# Patient Record
Sex: FEMALE | Race: WHITE | NOT HISPANIC OR LATINO
[De-identification: names, ages, dates, MRNs, and addresses within clinical notes are randomized per-mention and may not be internally consistent; named-entity substitution may affect disease eponyms.]

---
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
## 2020-12-14 ENCOUNTER — APPOINTMENT (OUTPATIENT)
Dept: PEDIATRIC RHEUMATOLOGY | Facility: CLINIC | Age: 20
End: 2020-12-14
Payer: COMMERCIAL

## 2020-12-14 VITALS
WEIGHT: 119.49 LBS | HEIGHT: 63.58 IN | TEMPERATURE: 97.8 F | BODY MASS INDEX: 20.91 KG/M2 | SYSTOLIC BLOOD PRESSURE: 123 MMHG | DIASTOLIC BLOOD PRESSURE: 86 MMHG | HEART RATE: 99 BPM

## 2020-12-14 PROCEDURE — 99072 ADDL SUPL MATRL&STAF TM PHE: CPT

## 2020-12-14 PROCEDURE — 99215 OFFICE O/P EST HI 40 MIN: CPT

## 2020-12-15 NOTE — PHYSICAL EXAM
[Conjunctiva] : normal conjunctiva [Pupils] : pupils were equal and round [Ears] : normal ears [Gums] : normal gums [Oropharynx] : normal oropharynx [Palate] : normal palate [Cardiac Auscultation] : normal cardiac auscultation  [Respiratory Effort] : normal respiratory effort [Auscultation] : lungs clear to auscultation [Liver] : normal liver [Spleen] : normal spleen [Range Of Motion] : full  range of motion [Gait] : normal gait [Grossly Intact] : grossly intact [Normal] : normal [Not Examined] : not examined [1] : 1 [_______] : Knee: [unfilled] [Rash] : no rash [Lesions] : no lesions [Malar Erythema] : no malar erythema [Ulcers] : no ulcers [Erythematous] : not erythematous [Peripheral Edema] : no peripheral edema  [Tenderness] : non tender [Mass ___ cm] : no masses were palpated [FreeTextEntry1] : In NAD [de-identified] : L>R 1cm

## 2020-12-15 NOTE — HISTORY OF PRESENT ILLNESS
[___ Month(s) Ago] : [unfilled] month(s) ago [Oligoarticular Persistent] : Oligoarticular Persistent [TREY Positive] : - TREY positive [Noncontributory] : The patient's family history was noncontributory [Unlimited ADLs] : able to do activities of daily living without limitations [Unlimited Sports] : able to participate in sports without limitations [3] : 3 [Currently Experiencing] : currently [Arthralgias] : arthralgias [Joint Swelling] : joint swelling [Morning Stiffness] : morning stiffness [Iritis] : no ~M iritis [Cataracts] : no cataracts [Glaucoma] : no glaucoma [de-identified] : Last seen Dec 2018 [FreeTextEntry3] : 2004 [FreeTextEntry1] : joint pain [Fever] : no fever [Malar Facial Rash] : no malar facial rash [Skin Lesions] : no skin lesions [Oral Ulcers] : no oral ulcers [Chest Pain] : no chest pain [Difficulty Standing] : no difficulty standing [Difficulty Walking] : no difficulty walking [Dyspnea] : no dyspnea [Eye Pain] : no eye pain [Eye Redness] : no eye redness [Skin Nodules] : no skin nodules [Cough] : no cough

## 2020-12-21 ENCOUNTER — APPOINTMENT (OUTPATIENT)
Dept: PEDIATRIC RHEUMATOLOGY | Facility: CLINIC | Age: 20
End: 2020-12-21
Payer: COMMERCIAL

## 2020-12-21 VITALS
BODY MASS INDEX: 20.91 KG/M2 | HEART RATE: 99 BPM | SYSTOLIC BLOOD PRESSURE: 119 MMHG | TEMPERATURE: 97.7 F | DIASTOLIC BLOOD PRESSURE: 82 MMHG | HEIGHT: 63.58 IN | WEIGHT: 119.49 LBS

## 2020-12-21 PROCEDURE — 99072 ADDL SUPL MATRL&STAF TM PHE: CPT

## 2020-12-21 PROCEDURE — 20610 DRAIN/INJ JOINT/BURSA W/O US: CPT

## 2020-12-21 NOTE — PROCEDURE
[Today's Date:] : Date: [unfilled] [Patient] : patient [Therapeutic] : therapeutic [LMX-4] : LMX-4 [Chlorhexidine] : chlorhexidine [22 gauge 1 inch] : A 22 gauge 1 inch needle was used [Kenolog ___mg] : [unfilled] mg of kenolog [Tolerated Well] : The patient tolerated the procedure well [No Complications] : There were no complications [Instructions Given] : Handouts/patient instructions were given to patient [de-identified] : LOT [id number] EXP 12-21

## 2021-01-04 ENCOUNTER — APPOINTMENT (OUTPATIENT)
Dept: PEDIATRIC RHEUMATOLOGY | Facility: CLINIC | Age: 21
End: 2021-01-04
Payer: COMMERCIAL

## 2021-01-04 VITALS
HEIGHT: 63.39 IN | HEART RATE: 120 BPM | WEIGHT: 119.71 LBS | DIASTOLIC BLOOD PRESSURE: 87 MMHG | BODY MASS INDEX: 20.95 KG/M2 | SYSTOLIC BLOOD PRESSURE: 123 MMHG | TEMPERATURE: 97.3 F

## 2021-01-04 PROCEDURE — 99072 ADDL SUPL MATRL&STAF TM PHE: CPT

## 2021-01-04 PROCEDURE — 99214 OFFICE O/P EST MOD 30 MIN: CPT

## 2021-01-04 RX ORDER — LISDEXAMFETAMINE DIMESYLATE 30 MG/1
30 CAPSULE ORAL
Qty: 30 | Refills: 0 | Status: DISCONTINUED | COMMUNITY
Start: 2020-09-22

## 2021-01-04 NOTE — REVIEW OF SYSTEMS
[Menarche] : ~T menarche [Fever] : no fever [Wgt Loss (___ Lbs)] : no recent weight loss [Rash] : no rash [Insect Bites] : no insect bites [Skin Lesions] : no skin lesions [Redness] : no redness [Blurry Vision] : no blurred vision [Change in Vision] : no change in vision  [Sore Throat] : no sore throat [Earache] : no earache [Nosebleeds] : no epistaxis [Oral Ulcers] : no oral ulcers [Chest Pain] : no chest pain or discomfort [Wheezing] : no wheezing [Cough] : no cough [Shortness of Breath] : no shortness of breath [Vomiting] : no vomiting [Diarrhea] : no diarrhea [Abdominal Pain] : no abdominal pain [Constipation] : no constipation [Irregular Periods] : no irregular periods [Joint Pains] : no arthralgias [Joint Swelling] : no joint swelling [AM Stiffness] : no am stiffness [Headache] : no headache [Dizziness] : no dizziness [Hyperactive] : no hyperactive behavior [Short Stature] : no short stature  [Swollen Glands] : no lymphadenopathy [Seasonal Allergies] : no seasonal allergies [Smokers in Home] : no one in home smokes [FreeTextEntry1] : records kept at [facility]

## 2021-01-04 NOTE — PHYSICAL EXAM
[Conjunctiva] : normal conjunctiva [Pupils] : pupils were equal and round [Ears] : normal ears [Gums] : normal gums [Oropharynx] : normal oropharynx [Palate] : normal palate [Cardiac Auscultation] : normal cardiac auscultation  [Respiratory Effort] : normal respiratory effort [Auscultation] : lungs clear to auscultation [Liver] : normal liver [Spleen] : normal spleen [Range Of Motion] : full  range of motion [Gait] : normal gait [Grossly Intact] : grossly intact [Normal] : normal [Not Examined] : not examined [1] : 1 [Rash] : no rash [Lesions] : no lesions [Malar Erythema] : no malar erythema [Ulcers] : no ulcers [Erythematous] : not erythematous [Peripheral Edema] : no peripheral edema  [Tenderness] : non tender [Mass ___ cm] : no masses were palpated [FreeTextEntry1] : In NAD [de-identified] : L>R 1cm

## 2021-01-04 NOTE — HISTORY OF PRESENT ILLNESS
[___ Week(s) Ago] : [unfilled] week(s) ago [Oligoarticular Persistent] : Oligoarticular Persistent [TREY Positive] : - TREY positive [Noncontributory] : The patient's family history was noncontributory [Unlimited ADLs] : able to do activities of daily living without limitations [Unlimited Sports] : able to participate in sports without limitations [3] : 3 [Currently Experiencing] : currently [Arthralgias] : arthralgias [Joint Swelling] : joint swelling [None] : The patient is currently asymptomatic [Iritis] : no ~M iritis [Cataracts] : no cataracts [Glaucoma] : no glaucoma [Morning Stiffness] : no morning stiffness [de-identified] : Last seen Dec 2020 [FreeTextEntry3] : 2004 [FreeTextEntry1] : joint pain [Fever] : no fever [Malar Facial Rash] : no malar facial rash [Skin Lesions] : no skin lesions [Oral Ulcers] : no oral ulcers [Chest Pain] : no chest pain [Difficulty Standing] : no difficulty standing [Difficulty Walking] : no difficulty walking [Dyspnea] : no dyspnea [Eye Pain] : no eye pain [Eye Redness] : no eye redness [Skin Nodules] : no skin nodules [Cough] : no cough

## 2021-08-02 ENCOUNTER — APPOINTMENT (OUTPATIENT)
Dept: PEDIATRIC RHEUMATOLOGY | Facility: CLINIC | Age: 21
End: 2021-08-02
Payer: COMMERCIAL

## 2021-08-02 VITALS
DIASTOLIC BLOOD PRESSURE: 88 MMHG | SYSTOLIC BLOOD PRESSURE: 126 MMHG | BODY MASS INDEX: 21.03 KG/M2 | HEIGHT: 63.62 IN | HEART RATE: 120 BPM | WEIGHT: 121.7 LBS

## 2021-08-02 PROCEDURE — 20610 DRAIN/INJ JOINT/BURSA W/O US: CPT

## 2021-08-02 PROCEDURE — 99215 OFFICE O/P EST HI 40 MIN: CPT | Mod: 25

## 2021-08-02 NOTE — PHYSICAL EXAM
[Pupils] : pupils were equal and round [Ears] : normal ears [Gums] : normal gums [Palate] : normal palate [Cardiac Auscultation] : normal cardiac auscultation  [Respiratory Effort] : normal respiratory effort [Gait] : normal gait [1] : 1 [_______] : Knee: [unfilled] [Rash] : no rash [Lesions] : no lesions [Malar Erythema] : no malar erythema [Ulcers] : no ulcers [Erythematous] : not erythematous [Peripheral Edema] : no peripheral edema  [FreeTextEntry1] : In NAD [de-identified] : L>R 1cm

## 2021-08-02 NOTE — REVIEW OF SYSTEMS
[Menarche] : ~T menarche [Joint Pains] : arthralgias [Joint Swelling] : joint swelling  [AM Stiffness] : am stiffness [Fever] : no fever [Wgt Loss (___ Lbs)] : no recent weight loss [Rash] : no rash [Insect Bites] : no insect bites [Skin Lesions] : no skin lesions [Redness] : no redness [Blurry Vision] : no blurred vision [Change in Vision] : no change in vision  [Sore Throat] : no sore throat [Earache] : no earache [Nosebleeds] : no epistaxis [Oral Ulcers] : no oral ulcers [Chest Pain] : no chest pain or discomfort [Wheezing] : no wheezing [Cough] : no cough [Shortness of Breath] : no shortness of breath [Vomiting] : no vomiting [Diarrhea] : no diarrhea [Abdominal Pain] : no abdominal pain [Constipation] : no constipation [Irregular Periods] : no irregular periods [Headache] : no headache [Dizziness] : no dizziness [Hyperactive] : no hyperactive behavior [Short Stature] : no short stature  [Swollen Glands] : no lymphadenopathy [Seasonal Allergies] : no seasonal allergies [Smokers in Home] : no one in home smokes [FreeTextEntry1] : records kept at [facility]

## 2021-08-02 NOTE — REASON FOR VISIT
[Follow-Up: _____] : [unfilled] is  being seen for a [unfilled] follow-up visit [Mother] : mother [Patient] : patient

## 2021-08-02 NOTE — PROCEDURE
[Today's Date:] : Date: [unfilled] [Patient] : patient [Consent Obtained] : written consent was obtained prior to the procedure and is detailed in the patient's record [Therapeutic] : therapeutic [#1 Site: ______] : #1 site identified in the [unfilled] [LMX-4] : LMX-4 [Betadine] : betadine solution [22 gauge 1.5 inch] : A 22 gauge 1.5 inch needle was used [Kenolog ___mg] : [unfilled] mg of kenolog [Tolerated Well] : The patient tolerated the procedure well [No Complications] : There were no complications [Instructions Given] : Handouts/patient instructions were given to patient [de-identified] : Lot [id number] EXP 12/21

## 2021-08-02 NOTE — HISTORY OF PRESENT ILLNESS
[Oligoarticular Persistent] : Oligoarticular Persistent [None] : The patient is currently asymptomatic [Noncontributory] : The patient's family history was noncontributory [Unlimited ADLs] : able to do activities of daily living without limitations [Unlimited Sports] : able to participate in sports without limitations [Currently Experiencing] : currently [Arthralgias] : arthralgias [Joint Swelling] : joint swelling [Morning Stiffness] : no morning stiffness [FreeTextEntry1] : joint pain [[facility] 60+ [Fever] : no fever [Malar Facial Rash] : no malar facial rash [Skin Lesions] : no skin lesions [Oral Ulcers] : no oral ulcers [Chest Pain] : no chest pain [Difficulty Standing] : no difficulty standing [Difficulty Walking] : no difficulty walking [Dyspnea] : no dyspnea [Eye Pain] : no eye pain [Eye Redness] : no eye redness [Skin Nodules] : no skin nodules [Cough] : no cough

## 2021-08-02 NOTE — PROCEDURE
[Today's Date:] : Date: [unfilled] [Patient] : patient [Consent Obtained] : written consent was obtained prior to the procedure and is detailed in the patient's record [Therapeutic] : therapeutic [#1 Site: ______] : #1 site identified in the [unfilled] [LMX-4] : LMX-4 [Betadine] : betadine solution [22 gauge 1.5 inch] : A 22 gauge 1.5 inch needle was used [Kenolog ___mg] : [unfilled] mg of kenolog [Tolerated Well] : The patient tolerated the procedure well [No Complications] : There were no complications [Instructions Given] : Handouts/patient instructions were given to patient [de-identified] : Lot [id number] EXP 12/21

## 2021-08-02 NOTE — PHYSICAL EXAM
[Pupils] : pupils were equal and round [Ears] : normal ears [Gums] : normal gums [Palate] : normal palate [Cardiac Auscultation] : normal cardiac auscultation  [Respiratory Effort] : normal respiratory effort [Gait] : normal gait [1] : 1 [_______] : Knee: [unfilled] [Rash] : no rash [Lesions] : no lesions [Malar Erythema] : no malar erythema [Ulcers] : no ulcers [Erythematous] : not erythematous [Peripheral Edema] : no peripheral edema  [FreeTextEntry1] : In NAD [de-identified] : L>R 1cm

## 2021-08-02 NOTE — HISTORY OF PRESENT ILLNESS
[Oligoarticular Persistent] : Oligoarticular Persistent [None] : The patient is currently asymptomatic [Noncontributory] : The patient's family history was noncontributory [Unlimited ADLs] : able to do activities of daily living without limitations [Unlimited Sports] : able to participate in sports without limitations [Currently Experiencing] : currently [Arthralgias] : arthralgias [Joint Swelling] : joint swelling [Morning Stiffness] : no morning stiffness [FreeTextEntry1] : joint pain [facility] 60+ [Fever] : no fever [Malar Facial Rash] : no malar facial rash [Skin Lesions] : no skin lesions [Oral Ulcers] : no oral ulcers [Chest Pain] : no chest pain [Difficulty Standing] : no difficulty standing [Difficulty Walking] : no difficulty walking [Dyspnea] : no dyspnea [Eye Pain] : no eye pain [Eye Redness] : no eye redness [Skin Nodules] : no skin nodules [Cough] : no cough

## 2021-11-24 ENCOUNTER — APPOINTMENT (OUTPATIENT)
Dept: PEDIATRIC RHEUMATOLOGY | Facility: CLINIC | Age: 21
End: 2021-11-24
Payer: COMMERCIAL

## 2021-11-24 VITALS
WEIGHT: 119.05 LBS | TEMPERATURE: 97.7 F | SYSTOLIC BLOOD PRESSURE: 116 MMHG | HEART RATE: 88 BPM | DIASTOLIC BLOOD PRESSURE: 73 MMHG | BODY MASS INDEX: 20.58 KG/M2 | HEIGHT: 63.94 IN

## 2021-11-24 PROCEDURE — 99215 OFFICE O/P EST HI 40 MIN: CPT

## 2021-11-24 RX ORDER — PREDNISONE 10 MG/1
10 TABLET ORAL
Qty: 30 | Refills: 1 | Status: ACTIVE | COMMUNITY
Start: 2021-11-24 | End: 1900-01-01

## 2021-11-24 NOTE — HISTORY OF PRESENT ILLNESS
[Oligoarticular Persistent] : Oligoarticular Persistent [None] : The patient is currently asymptomatic [Noncontributory] : The patient's family history was noncontributory [Unlimited ADLs] : able to do activities of daily living without limitations [Unlimited Sports] : able to participate in sports without limitations [Currently Experiencing] : currently [Arthralgias] : arthralgias [Joint Swelling] : joint swelling [Morning Stiffness] : no morning stiffness [FreeTextEntry1] : joint pain [[facility] 60+ [Fever] : no fever [Malar Facial Rash] : no malar facial rash [Skin Lesions] : no skin lesions [Oral Ulcers] : no oral ulcers [Chest Pain] : no chest pain [Difficulty Standing] : no difficulty standing [Difficulty Walking] : no difficulty walking [Dyspnea] : no dyspnea [Eye Pain] : no eye pain [Eye Redness] : no eye redness [Skin Nodules] : no skin nodules [Cough] : no cough

## 2021-11-24 NOTE — PHYSICAL EXAM
[Pupils] : pupils were equal and round [Ears] : normal ears [Gums] : normal gums [Palate] : normal palate [Cardiac Auscultation] : normal cardiac auscultation  [Respiratory Effort] : normal respiratory effort [Gait] : normal gait [1] : 1 [_______] : Knee: [unfilled] [Rash] : no rash [Lesions] : no lesions [Malar Erythema] : no malar erythema [Ulcers] : no ulcers [Erythematous] : not erythematous [Peripheral Edema] : no peripheral edema  [FreeTextEntry1] : In NAD [de-identified] : L>R 1cm

## 2021-12-01 ENCOUNTER — NON-APPOINTMENT (OUTPATIENT)
Age: 21
End: 2021-12-01

## 2021-12-06 ENCOUNTER — NON-APPOINTMENT (OUTPATIENT)
Age: 21
End: 2021-12-06

## 2021-12-08 ENCOUNTER — NON-APPOINTMENT (OUTPATIENT)
Age: 21
End: 2021-12-08

## 2021-12-20 ENCOUNTER — APPOINTMENT (OUTPATIENT)
Dept: PEDIATRIC RHEUMATOLOGY | Facility: CLINIC | Age: 21
End: 2021-12-20
Payer: COMMERCIAL

## 2021-12-20 VITALS
HEIGHT: 63.66 IN | DIASTOLIC BLOOD PRESSURE: 83 MMHG | SYSTOLIC BLOOD PRESSURE: 117 MMHG | HEART RATE: 103 BPM | WEIGHT: 116.18 LBS | BODY MASS INDEX: 20.08 KG/M2

## 2021-12-20 DIAGNOSIS — M06.9 RHEUMATOID ARTHRITIS, UNSPECIFIED: ICD-10-CM

## 2021-12-20 PROCEDURE — 99215 OFFICE O/P EST HI 40 MIN: CPT

## 2021-12-20 RX ORDER — ESCITALOPRAM OXALATE 5 MG/1
TABLET, FILM COATED ORAL
Refills: 0 | Status: ACTIVE | COMMUNITY

## 2021-12-20 RX ORDER — METHOTREXATE 2.5 MG/1
2.5 TABLET ORAL
Qty: 1 | Refills: 2 | Status: ACTIVE | COMMUNITY
Start: 2021-12-20 | End: 1900-01-01

## 2021-12-20 RX ORDER — ATOMOXETINE 40 MG/1
40 CAPSULE ORAL
Qty: 30 | Refills: 0 | Status: DISCONTINUED | COMMUNITY
Start: 2020-12-29 | End: 2021-12-20

## 2021-12-20 RX ORDER — LISDEXAMFETAMINE DIMESYLATE 10 MG/1
CAPSULE ORAL
Refills: 0 | Status: ACTIVE | COMMUNITY

## 2021-12-22 NOTE — PHYSICAL EXAM
[Pupils] : pupils were equal and round [Ears] : normal ears [Gums] : normal gums [Palate] : normal palate [Cardiac Auscultation] : normal cardiac auscultation  [Respiratory Effort] : normal respiratory effort [Gait] : normal gait [1] : 1 [_______] : Knee: [unfilled] [Joint effusions] : joint effusions [Range Of Motion] : limited range of motion [Rash] : no rash [Lesions] : no lesions [Malar Erythema] : no malar erythema [Ulcers] : no ulcers [Erythematous] : not erythematous [Peripheral Edema] : no peripheral edema  [FreeTextEntry1] : In NAD [de-identified] : L>R 1cm

## 2021-12-22 NOTE — PHYSICAL EXAM
[Pupils] : pupils were equal and round [Ears] : normal ears [Gums] : normal gums [Palate] : normal palate [Cardiac Auscultation] : normal cardiac auscultation  [Respiratory Effort] : normal respiratory effort [Gait] : normal gait [1] : 1 [_______] : Knee: [unfilled] [Joint effusions] : joint effusions [Range Of Motion] : limited range of motion [Rash] : no rash [Lesions] : no lesions [Malar Erythema] : no malar erythema [Ulcers] : no ulcers [Erythematous] : not erythematous [Peripheral Edema] : no peripheral edema  [FreeTextEntry1] : In NAD [de-identified] : L>R 1cm

## 2021-12-22 NOTE — HISTORY OF PRESENT ILLNESS
[Oligoarticular Persistent] : Oligoarticular Persistent [None] : The patient is currently asymptomatic [Noncontributory] : The patient's family history was noncontributory [Unlimited ADLs] : able to do activities of daily living without limitations [Unlimited Sports] : able to participate in sports without limitations [Currently Experiencing] : currently [Arthralgias] : arthralgias [Joint Swelling] : joint swelling [Morning Stiffness] : no morning stiffness [FreeTextEntry1] : joint pain [facility] 60+ [Fever] : no fever [Malar Facial Rash] : no malar facial rash [Skin Lesions] : no skin lesions [Oral Ulcers] : no oral ulcers [Chest Pain] : no chest pain [Difficulty Standing] : no difficulty standing [Difficulty Walking] : no difficulty walking [Dyspnea] : no dyspnea [Eye Pain] : no eye pain [Eye Redness] : no eye redness [Skin Nodules] : no skin nodules [Cough] : no cough

## 2021-12-22 NOTE — HISTORY OF PRESENT ILLNESS
[Oligoarticular Persistent] : Oligoarticular Persistent [None] : The patient is currently asymptomatic [Noncontributory] : The patient's family history was noncontributory [Unlimited ADLs] : able to do activities of daily living without limitations [Unlimited Sports] : able to participate in sports without limitations [Currently Experiencing] : currently [Arthralgias] : arthralgias [Joint Swelling] : joint swelling [Morning Stiffness] : no morning stiffness [FreeTextEntry1] : joint pain [facility] 60+ [Fever] : no fever [Malar Facial Rash] : no malar facial rash [Skin Lesions] : no skin lesions [Oral Ulcers] : no oral ulcers [Chest Pain] : no chest pain [Difficulty Standing] : no difficulty standing [Difficulty Walking] : no difficulty walking [Dyspnea] : no dyspnea [Eye Pain] : no eye pain [Eye Redness] : no eye redness [Skin Nodules] : no skin nodules [Cough] : no cough

## 2021-12-23 ENCOUNTER — APPOINTMENT (OUTPATIENT)
Dept: PEDIATRIC RHEUMATOLOGY | Facility: CLINIC | Age: 21
End: 2021-12-23
Payer: COMMERCIAL

## 2021-12-23 VITALS
DIASTOLIC BLOOD PRESSURE: 79 MMHG | TEMPERATURE: 97.9 F | WEIGHT: 114.99 LBS | SYSTOLIC BLOOD PRESSURE: 113 MMHG | HEIGHT: 63.94 IN | HEART RATE: 89 BPM | BODY MASS INDEX: 19.88 KG/M2

## 2021-12-23 PROCEDURE — 20610 DRAIN/INJ JOINT/BURSA W/O US: CPT

## 2021-12-23 PROCEDURE — 99212 OFFICE O/P EST SF 10 MIN: CPT | Mod: 25

## 2021-12-23 NOTE — PROCEDURE
[Today's Date:] : Date: [unfilled] [Patient] : patient [Therapeutic] : therapeutic [#1 Site: ______] : #1 site identified in the [unfilled] [LMX-4] : LMX-4 [Chlorhexidine] : chlorhexidine [25 gauge 1.5 inch] : A 25 gauge 1.5 inch needle was used [Kenolog ___mg] : [unfilled] mg of kenolog [Tolerated Well] : The patient tolerated the procedure well [No Complications] : There were no complications [Patient Instructed to Call] : Patient was instructed to call if redness at site, a decrease in range of motion or an increase in pain is noted after procedure. [de-identified] : ABX [id number], exp Feb 2023

## 2021-12-27 ENCOUNTER — NON-APPOINTMENT (OUTPATIENT)
Age: 21
End: 2021-12-27

## 2022-01-04 ENCOUNTER — NON-APPOINTMENT (OUTPATIENT)
Age: 22
End: 2022-01-04

## 2022-01-28 RX ORDER — ADALIMUMAB 40MG/0.4ML
40 KIT SUBCUTANEOUS
Qty: 1 | Refills: 2 | Status: ACTIVE | COMMUNITY
Start: 2021-11-24 | End: 1900-01-01

## 2022-02-02 ENCOUNTER — NON-APPOINTMENT (OUTPATIENT)
Age: 22
End: 2022-02-02

## 2022-05-24 ENCOUNTER — NON-APPOINTMENT (OUTPATIENT)
Age: 22
End: 2022-05-24

## 2022-06-01 ENCOUNTER — NON-APPOINTMENT (OUTPATIENT)
Age: 22
End: 2022-06-01

## 2023-01-23 ENCOUNTER — NON-APPOINTMENT (OUTPATIENT)
Age: 23
End: 2023-01-23